# Patient Record
Sex: FEMALE | Race: OTHER | HISPANIC OR LATINO | URBAN - METROPOLITAN AREA
[De-identification: names, ages, dates, MRNs, and addresses within clinical notes are randomized per-mention and may not be internally consistent; named-entity substitution may affect disease eponyms.]

---

## 2017-03-30 ENCOUNTER — OUTPATIENT (OUTPATIENT)
Dept: OUTPATIENT SERVICES | Facility: HOSPITAL | Age: 52
LOS: 1 days | Discharge: HOME | End: 2017-03-30
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
## 2017-06-27 DIAGNOSIS — S82.62XA DISPLACED FRACTURE OF LATERAL MALLEOLUS OF LEFT FIBULA, INITIAL ENCOUNTER FOR CLOSED FRACTURE: ICD-10-CM
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
## 2017-06-27 DIAGNOSIS — I10 ESSENTIAL (PRIMARY) HYPERTENSION: ICD-10-CM
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
## 2018-11-07 ENCOUNTER — EMERGENCY (EMERGENCY)
Facility: HOSPITAL | Age: 53
LOS: 0 days | Discharge: HOME | End: 2018-11-08
Admitting: PHYSICIAN ASSISTANT

## 2018-11-07 VITALS — WEIGHT: 139.99 LBS

## 2018-11-07 VITALS
RESPIRATION RATE: 20 BRPM | OXYGEN SATURATION: 100 % | SYSTOLIC BLOOD PRESSURE: 179 MMHG | HEART RATE: 91 BPM | TEMPERATURE: 97 F | DIASTOLIC BLOOD PRESSURE: 90 MMHG

## 2018-11-07 DIAGNOSIS — J32.0 CHRONIC MAXILLARY SINUSITIS: ICD-10-CM

## 2018-11-07 DIAGNOSIS — I10 ESSENTIAL (PRIMARY) HYPERTENSION: ICD-10-CM

## 2018-11-07 DIAGNOSIS — R09.81 NASAL CONGESTION: ICD-10-CM

## 2018-11-07 DIAGNOSIS — Z79.899 OTHER LONG TERM (CURRENT) DRUG THERAPY: ICD-10-CM

## 2018-11-07 DIAGNOSIS — R05 COUGH: ICD-10-CM

## 2018-11-07 DIAGNOSIS — R06.2 WHEEZING: ICD-10-CM

## 2018-11-07 RX ORDER — IPRATROPIUM/ALBUTEROL SULFATE 18-103MCG
3 AEROSOL WITH ADAPTER (GRAM) INHALATION
Qty: 0 | Refills: 0 | Status: COMPLETED | OUTPATIENT
Start: 2018-11-07 | End: 2018-11-07

## 2018-11-07 RX ADMIN — Medication 3 MILLILITER(S): at 23:20

## 2018-11-07 RX ADMIN — Medication 3 MILLILITER(S): at 23:09

## 2018-11-07 RX ADMIN — Medication 3 MILLILITER(S): at 23:29

## 2018-11-07 NOTE — ED ADULT NURSE NOTE - NSIMPLEMENTINTERV_GEN_ALL_ED
Implemented All Universal Safety Interventions:  [place] to call system. Call bell, personal items and telephone within reach. Instruct patient to call for assistance. Room bathroom lighting operational. Non-slip footwear when patient is off stretcher. Physically safe environment: no spills, clutter or unnecessary equipment. Stretcher in lowest position, wheels locked, appropriate side rails in place.

## 2018-11-07 NOTE — ED PROVIDER NOTE - PROGRESS NOTE DETAILS
Discussed with patient the risks of uncontrolled hypertension including MI, CVA, renal disease , PVD and others.  Pt was instructed to follow up with their PCP within 48 hours to re-asses blood pressure.  Pt was also instructed that if a headache, vision change, altered conciouseness, severe headache, stroke like symptoms or hematuria develop to return to the Emergency Room.  pt advised many OTC cold meds not safe for HTN and pt was not aware; will stop OTC meds aside from tylenol/motrin as needed or saline nasal spray side effects of steroids discussed. risk for GI upset. PUD, gerd, gastrities, bleeding explained.  take steroid w food, may use otc prilosec if GI upset.  d/c use if intolerable s/e , ie pain, psychosis  History, time course, and exam consistent with sinusitis.  For treatment with antibiotics and with nasal spray.Discussed probiotics with antibiotics, potential side effects.  discussed use of humidifiers, vaseline to nares while sleeping, nasal saline for moisture improved post neb tx, ready to go home

## 2018-11-07 NOTE — ED PROVIDER NOTE - OBJECTIVE STATEMENT
pt with sinus/nasal congestion, post nasal drip and cough- worse at night- for 1-2 weeks  has tried several OTC meds with mild relief  no sick contacts or recent travel  Denies fever/chill/HA/dizziness/chest pain/palpitation/sob/abd pain/n/v/d/ black stool/bloody stool/urinary sxs

## 2018-11-07 NOTE — ED ADULT TRIAGE NOTE - CHIEF COMPLAINT QUOTE
"I cant breathe through my nose and my throat feels like mucus is in there. It drips from my nose into my throat the mucus." Symptoms started 3 weeks ago, has tried OTC medication with no relief but reports she used her daughters nebulizer and felt better afterwards. Lungs clear to auscultation. Nehal Goyal  (RN)  2017 16:54:58

## 2018-11-07 NOTE — ED PROVIDER NOTE - NS ED ROS FT
Constitutional: no fever, chills, no recent weight loss, change in appetite or malaise  Eyes: no redness/discharge/pain/vision changes  ENT: no ear pain/sore throat  Cardiac: No chest pain, SOB or edema.  Respiratory: No respiratory distress  GI: No nausea, vomiting, diarrhea or abdominal pain.  : No dysuria, frequency, urgency or hematuria  MS: no pain to back or extremities, no loss of ROM, no weakness  Neuro: No headache or weakness. No LOC.  Skin: No skin rash.  Endocrine: No history of thyroid disease or diabetes.  Except as documented in the HPI, all other systems are negative.

## 2018-11-07 NOTE — ED PROVIDER NOTE - PHYSICAL EXAMINATION
CONSTITUTIONAL: Well-appearing; well-nourished; in no apparent distress.   EYES: PERRL; EOM intact.   ENT: +post nasal drip; normal nose; no rhinorrhea; normal pharynx with no tonsillar hypertrophy.   NECK: Supple; non-tender; no cervical lymphadenopathy. No JVD.   CARDIOVASCULAR: Normal S1, S2; no murmurs, rubs, or gallops.   RESPIRATORY: +mild wheezing b/l, Normal chest excursion with respiration; breath sounds equal bilaterally; no rhonchi, or rales.  SKIN: Normal for age and race; warm; dry; good turgor; no apparent lesions or exudate.   NEURO/PSYCH: A & O x 4; grossly unremarkable. mood and manner are appropriate. Grooming and personal hygiene are appropriate. No apparent thoughts of harm to self or others. CONSTITUTIONAL: Well-appearing; well-nourished; in no apparent distress.   EYES: PERRL; EOM intact.   ENT: +maxillary sinus ttp, +post nasal drip; normal nose; no rhinorrhea; normal pharynx with no tonsillar hypertrophy.   NECK: Supple; non-tender; no cervical lymphadenopathy. No JVD.   CARDIOVASCULAR: Normal S1, S2; no murmurs, rubs, or gallops.   RESPIRATORY: +mild wheezing b/l, Normal chest excursion with respiration; breath sounds equal bilaterally; no rhonchi, or rales.  SKIN: Normal for age and race; warm; dry; good turgor; no apparent lesions or exudate.   NEURO/PSYCH: A & O x 4; grossly unremarkable. mood and manner are appropriate. Grooming and personal hygiene are appropriate. No apparent thoughts of harm to self or others.

## 2018-11-07 NOTE — ED ADULT NURSE NOTE - CHIEF COMPLAINT QUOTE
"I cant breathe through my nose and my throat feels like mucus is in there. It drips from my nose into my throat the mucus." Symptoms started 3 weeks ago, has tried OTC medication with no relief but reports she used her daughters nebulizer and felt better afterwards. Lungs clear to auscultation.

## 2018-11-08 RX ADMIN — Medication 1 TABLET(S): at 00:05

## 2020-01-13 NOTE — ED ADULT NURSE NOTE - OBJECTIVE STATEMENT
The patient is a 89y Male complaining of fall. patient c/o of postnasal drip for 2 days, patient denies n/v/d/.pt states over counter meds didn't relieve symptoms. patient states she has nka. patient has hx of htn and glaucoma

## 2020-05-17 ENCOUNTER — EMERGENCY (EMERGENCY)
Facility: HOSPITAL | Age: 55
LOS: 0 days | Discharge: HOME | End: 2020-05-17
Attending: EMERGENCY MEDICINE | Admitting: EMERGENCY MEDICINE
Payer: COMMERCIAL

## 2020-05-17 VITALS
DIASTOLIC BLOOD PRESSURE: 96 MMHG | OXYGEN SATURATION: 100 % | HEART RATE: 82 BPM | RESPIRATION RATE: 16 BRPM | TEMPERATURE: 97 F | SYSTOLIC BLOOD PRESSURE: 170 MMHG

## 2020-05-17 VITALS
SYSTOLIC BLOOD PRESSURE: 149 MMHG | RESPIRATION RATE: 18 BRPM | DIASTOLIC BLOOD PRESSURE: 83 MMHG | OXYGEN SATURATION: 99 % | HEART RATE: 62 BPM

## 2020-05-17 DIAGNOSIS — I10 ESSENTIAL (PRIMARY) HYPERTENSION: ICD-10-CM

## 2020-05-17 DIAGNOSIS — Z79.899 OTHER LONG TERM (CURRENT) DRUG THERAPY: ICD-10-CM

## 2020-05-17 DIAGNOSIS — H57.10 OCULAR PAIN, UNSPECIFIED EYE: ICD-10-CM

## 2020-05-17 DIAGNOSIS — H40.212 ACUTE ANGLE-CLOSURE GLAUCOMA, LEFT EYE: ICD-10-CM

## 2020-05-17 PROCEDURE — 99284 EMERGENCY DEPT VISIT MOD MDM: CPT

## 2020-05-17 RX ORDER — KETOROLAC TROMETHAMINE 30 MG/ML
30 SYRINGE (ML) INJECTION ONCE
Refills: 0 | Status: DISCONTINUED | OUTPATIENT
Start: 2020-05-17 | End: 2020-05-17

## 2020-05-17 RX ORDER — TIMOLOL 0.5 %
0 DROPS OPHTHALMIC (EYE)
Qty: 0 | Refills: 0 | DISCHARGE

## 2020-05-17 RX ORDER — LOSARTAN POTASSIUM 100 MG/1
0 TABLET, FILM COATED ORAL
Qty: 0 | Refills: 0 | DISCHARGE

## 2020-05-17 RX ORDER — HYDROCHLOROTHIAZIDE 25 MG
0 TABLET ORAL
Qty: 0 | Refills: 0 | DISCHARGE

## 2020-05-17 RX ORDER — BRIMONIDINE TARTRATE 2 MG/MG
1 SOLUTION/ DROPS OPHTHALMIC ONCE
Refills: 0 | Status: COMPLETED | OUTPATIENT
Start: 2020-05-17 | End: 2020-05-17

## 2020-05-17 RX ORDER — ACETAZOLAMIDE 250 MG/1
500 TABLET ORAL ONCE
Refills: 0 | Status: COMPLETED | OUTPATIENT
Start: 2020-05-17 | End: 2020-05-17

## 2020-05-17 RX ORDER — METOPROLOL TARTRATE 50 MG
0 TABLET ORAL
Qty: 0 | Refills: 0 | DISCHARGE

## 2020-05-17 RX ORDER — ACETAZOLAMIDE 250 MG/1
1 TABLET ORAL
Qty: 3 | Refills: 0
Start: 2020-05-17 | End: 2020-05-19

## 2020-05-17 RX ADMIN — Medication 30 MILLIGRAM(S): at 09:16

## 2020-05-17 RX ADMIN — ACETAZOLAMIDE 500 MILLIGRAM(S): 250 TABLET ORAL at 09:16

## 2020-05-17 RX ADMIN — BRIMONIDINE TARTRATE 1 DROP(S): 2 SOLUTION/ DROPS OPHTHALMIC at 11:19

## 2020-05-17 NOTE — ED PROVIDER NOTE - PATIENT PORTAL LINK FT
You can access the FollowMyHealth Patient Portal offered by [facility] by registering at the following website: [email or web address]. By joining [facility]’s FollowMyHealth portal, you will also be able to view your health information using other applications (apps) compatible with our system.

## 2020-05-17 NOTE — ED PROVIDER NOTE - CLINICAL SUMMARY MEDICAL DECISION MAKING FREE TEXT BOX
pt p/w acute angle glaucoma, pressure improved a diamox and eye drops, pain improved, redness resolved, seen by ophtho dr kenney (paper note in chart), f/u tomorrow morning in eye clinic to get laser for definitive treatment, pt dc home w brimonidine drops TID and diamox PO daily x 3 days, strict return precautions provided.

## 2020-05-17 NOTE — ED PROVIDER NOTE - EYE, LEFT
CONJUNCTIVA ERYTHEMA/pupils equal, round, and reactive to light CONJUNCTIVA ERYTHEMA/IOP 47/pupils equal, round, and reactive to light

## 2020-05-17 NOTE — ED PROVIDER NOTE - OBJECTIVE STATEMENT
53 y/o female with hx HTN, Glaucoma presents to the ED c/o "I have left eye pain, redness and blurriness since yesterday." no hx trauma/ HA/ weakness/ drainage 53 y/o female with hx HTN, Glaucoma presents to the ED c/o "I have left eye pain, redness and blurriness since yesterday." no hx trauma/ weakness/ drainage

## 2020-05-17 NOTE — ED PROVIDER NOTE - PROGRESS NOTE DETAILS
IOP in R eye 18, IOP in L eye 47  Spoke with Dr Len jaffe, recommends Diamox 500 mg PO stat (RN gave to pt at 915am), pt is going to take her eye drops q 15mins x 3 (timolol/dorzolamide and Lumigan), and recheck pressure at 11am.   POCUS negative for retinal detachment and vitreous hemorrhage. Negative fluorescein testing. N IOP in R eye 18, IOP in L eye 47  Spoke with Dr Len jaffe, recommends Diamox 500 mg PO stat (RN gave to pt at 915am), pt is going to take her eye drops q 15mins x 3 (timolol/dorzolamide and Lumigan), and recheck pressure at 11am.   POCUS negative for retinal detachment and vitreous hemorrhage. Negative fluorescein testing. No sign of conjunctivitis. 20/25 bilaterally Repeat IOP after drops R eye 11, L eye 60,57,48. Called back Optho CCRUZ: Spoke with Optho, Dr Harrington, will come see pt. Recommends repeating the drops q15 mins x 3 IOP in R eye 18, IOP in L eye 47  Spoke with Dr Len jaffe, recommends Diamox 500 mg PO stat (RN gave to pt at 915am), pt is going to take her eye drops q 15mins x 3 (timolol/dorzolamide and Lumigan), and recheck pressure at 11am.   POCUS negative for retinal detachment and vitreous hemorrhage. No papilledema.  Negative fluorescein testing. No sign of conjunctivitis. 20/25 bilaterally CCRUZ: Spoke with Optho, Dr Harrington, will come see pt. Recommends repeating the drops q15 mins x 3 and adding brimonadine

## 2020-05-17 NOTE — ED PROVIDER NOTE - ATTENDING CONTRIBUTION TO CARE
54F pmh glaucoma, HTN, p/w L eye pain since yesterday 3pm. Pressure constant nonradiating. Assoc w photophobia and blurriness. [place] [facility] [place] fever, cough, cp, sob. No ha, numbness, weakness, tingling, slurred speech, trouble walking. No eye discharge. no eye trauma/itching. Uses reading glasses, no contacts.     on exam, AFVSS, well kylah nad, ncat, eomi, bilateral pupils dilated but reactive, +conjunctiva injected/erythematous, no discharge, no proptosis, no pain w eye movements, no periorbital edema/erythema, no FB, perrla, mmm, aaox3, CN 2-12 intact, No nystagmus.  5/5 motor x 4 ext, SILT x 4 extremities, No facial droop or slurred speech. No pronator drift.  Normal rapid alternating movement and finger nose finger bilaterally. No midline C/T/L tenderness to palpation or step off. Normal gait, No ataxia.  , no le edema or calf ttp,     a/p; Concern for acute angle glaucoma, will check pressure, visual acuity, fluorescein testing for corneal abrasion, POCUS to r/o retinal detachment/papilledema,

## 2020-05-17 NOTE — ED ADULT NURSE NOTE - OBJECTIVE STATEMENT
Patient present to ED with complains of left eye pressure and blurry vision since yesterday while at work, states that pressure is consistent now, denies fever, coughing, abdomen pain, nausea, vomiting, numbness and tingling sensation. Neuro checks intact, noted redness on left eye, no discharges noted.

## 2020-05-17 NOTE — ED PROVIDER NOTE - NSFOLLOWUPCLINICS_GEN_ALL_ED_FT
[facility]  Ophthalmolgy  [address], [address]  [place] [address]  Phone: [phone]  Fax:   Follow Up Time:

## 2020-05-17 NOTE — ED PROVIDER NOTE - NSFOLLOWUPINSTRUCTIONS_ED_ALL_ED_FT
Glaucoma  The nerve connecting the eye to the brain is damaged, usually due to high eye pressure.    Take Diamox 250 mg take 1 tab for next 3 days.  Bromandine 1 drop three times  a day.  Follow-up with Optho clinic tomorrow.  Return to the ED for any worsening symptoms.

## 2020-12-09 ENCOUNTER — EMERGENCY (EMERGENCY)
Facility: HOSPITAL | Age: 55
LOS: 0 days | Discharge: HOME | End: 2020-12-10
Attending: EMERGENCY MEDICINE | Admitting: EMERGENCY MEDICINE
Payer: COMMERCIAL

## 2020-12-09 VITALS
RESPIRATION RATE: 14 BRPM | DIASTOLIC BLOOD PRESSURE: 81 MMHG | SYSTOLIC BLOOD PRESSURE: 128 MMHG | OXYGEN SATURATION: 99 % | TEMPERATURE: 97 F | HEART RATE: 66 BPM

## 2020-12-09 VITALS
HEART RATE: 65 BPM | OXYGEN SATURATION: 100 % | SYSTOLIC BLOOD PRESSURE: 196 MMHG | RESPIRATION RATE: 16 BRPM | TEMPERATURE: 98 F | DIASTOLIC BLOOD PRESSURE: 100 MMHG

## 2020-12-09 DIAGNOSIS — I10 ESSENTIAL (PRIMARY) HYPERTENSION: ICD-10-CM

## 2020-12-09 LAB
ALBUMIN SERPL ELPH-MCNC: 4.9 G/DL — SIGNIFICANT CHANGE UP (ref 3.5–5.2)
ALP SERPL-CCNC: 114 U/L — SIGNIFICANT CHANGE UP (ref 30–115)
ALT FLD-CCNC: 80 U/L — HIGH (ref 0–41)
ANION GAP SERPL CALC-SCNC: 16 MMOL/L — HIGH (ref 7–14)
AST SERPL-CCNC: 43 U/L — HIGH (ref 0–41)
BILIRUB SERPL-MCNC: 0.4 MG/DL — SIGNIFICANT CHANGE UP (ref 0.2–1.2)
BUN SERPL-MCNC: 16 MG/DL — SIGNIFICANT CHANGE UP (ref 10–20)
CALCIUM SERPL-MCNC: 11.5 MG/DL — HIGH (ref 8.5–10.1)
CHLORIDE SERPL-SCNC: 91 MMOL/L — LOW (ref 98–110)
CO2 SERPL-SCNC: 27 MMOL/L — SIGNIFICANT CHANGE UP (ref 17–32)
CREAT SERPL-MCNC: 0.9 MG/DL — SIGNIFICANT CHANGE UP (ref 0.7–1.5)
GLUCOSE SERPL-MCNC: 94 MG/DL — SIGNIFICANT CHANGE UP (ref 70–99)
HCT VFR BLD CALC: 42 % — SIGNIFICANT CHANGE UP (ref 37–47)
HGB BLD-MCNC: 13.9 G/DL — SIGNIFICANT CHANGE UP (ref 12–16)
MCHC RBC-ENTMCNC: 30.3 PG — SIGNIFICANT CHANGE UP (ref 27–31)
MCHC RBC-ENTMCNC: 33.1 G/DL — SIGNIFICANT CHANGE UP (ref 32–37)
MCV RBC AUTO: 91.7 FL — SIGNIFICANT CHANGE UP (ref 81–99)
NRBC # BLD: 0 /100 WBCS — SIGNIFICANT CHANGE UP (ref 0–0)
PLATELET # BLD AUTO: 238 K/UL — SIGNIFICANT CHANGE UP (ref 130–400)
POTASSIUM SERPL-MCNC: 3.7 MMOL/L — SIGNIFICANT CHANGE UP (ref 3.5–5)
POTASSIUM SERPL-SCNC: 3.7 MMOL/L — SIGNIFICANT CHANGE UP (ref 3.5–5)
PROT SERPL-MCNC: 8.9 G/DL — HIGH (ref 6–8)
RBC # BLD: 4.58 M/UL — SIGNIFICANT CHANGE UP (ref 4.2–5.4)
RBC # FLD: 13.9 % — SIGNIFICANT CHANGE UP (ref 11.5–14.5)
SODIUM SERPL-SCNC: 134 MMOL/L — LOW (ref 135–146)
TROPONIN T SERPL-MCNC: <0.01 NG/ML — SIGNIFICANT CHANGE UP
WBC # BLD: 7.6 K/UL — SIGNIFICANT CHANGE UP (ref 4.8–10.8)
WBC # FLD AUTO: 7.6 K/UL — SIGNIFICANT CHANGE UP (ref 4.8–10.8)

## 2020-12-09 PROCEDURE — 99285 EMERGENCY DEPT VISIT HI MDM: CPT

## 2020-12-09 PROCEDURE — 93010 ELECTROCARDIOGRAM REPORT: CPT

## 2020-12-09 PROCEDURE — 70450 CT HEAD/BRAIN W/O DYE: CPT | Mod: 26

## 2020-12-09 RX ORDER — ONDANSETRON 8 MG/1
4 TABLET, FILM COATED ORAL ONCE
Refills: 0 | Status: COMPLETED | OUTPATIENT
Start: 2020-12-09 | End: 2020-12-09

## 2020-12-09 RX ORDER — ACETAMINOPHEN 500 MG
650 TABLET ORAL ONCE
Refills: 0 | Status: COMPLETED | OUTPATIENT
Start: 2020-12-09 | End: 2020-12-09

## 2020-12-09 RX ADMIN — Medication 650 MILLIGRAM(S): at 23:09

## 2020-12-09 NOTE — ED ADULT NURSE NOTE - CHIEF COMPLAINT QUOTE
Pt here for elevated BP. C/o headache and nausea. Pt took an extra losartan before coming to the hospital

## 2020-12-09 NOTE — ED ADULT TRIAGE NOTE - CHIEF COMPLAINT QUOTE
Pt here for elevated BP. C/o headache and nausea. Pt took an extra losartan before coming to the hospital Pt here for elevated BP. C/o headache and nausea. Pt took an extra losartan before coming to the hospital.  can be reached at [phone]

## 2020-12-09 NOTE — ED PROVIDER NOTE - PHYSICAL EXAMINATION
PHYSICAL EXAM: I have reviewed current vital signs.  GENERAL: NAD, well-nourished; well-developed.  HEAD:  Normocephalic, atraumatic.  EYES: EOMI, PERRL, conjunctiva and sclera clear.  ENT: MMM, no erythema/exudates.  NECK: Supple, no JVD.  CHEST/LUNG: Clear to auscultation bilaterally; no wheezes, rales, or rhonchi.  HEART: Regular rate and rhythm, normal S1 and S2; no murmurs, rubs, or gallops.  ABDOMEN: Soft, nontender, nondistended.  EXTREMITIES:  2+ peripheral pulses; no clubbing, cyanosis, or edema.  PSYCH: Cooperative, appropriate, normal mood and affect.  NEUROLOGY: A&O x 3. Motor 5/5. Sensory intact. No focal neurological deficits. CN II - XII intact. (-) dysmetria, facial droop, pronator drift. visual acuity 20/20 in both eyes  SKIN: Warm and dry.

## 2020-12-09 NOTE — ED PROVIDER NOTE - NS ED ROS FT
Constitutional:  No fevers or chills.  Eyes:  (+) visual changes. No eye pain, or discharge.  ENT:  No hearing changes. No sore throat.  Neck:  No neck pain or stiffness.  Cardiac:  No CP or edema.  Resp:  No cough or SOB. No hemoptysis.   GI: (+) nausea and vomiting. No diarrhea or abdominal pain.  :  No dysuria, frequency, or hematuria.  MSK:  No myalgias or joint pain/swelling.  Neuro:  (+) headache.  No dizziness or weakness.  Skin:  No skin rash.

## 2020-12-09 NOTE — ED PROVIDER NOTE - OBJECTIVE STATEMENT
55y female with PMH of glaucoma, HTN, DLD presents with new onset constant HA b/l feels like a band around head,  reports seeing spots,  N/V 2x NBNB, and home BP of 225/108 all starting at 4pm today.  patient was concerned and took an extra dose of her losartan/HCTZ today at 6pm.  BP at triage was 196/100.  Patient is compliant with home BP meds of Losartan/HCTZ and took her meds this am. Denies head trauma, dizziness, weakness, dysphagia, dysarthria, changes in hearing, chest pain, abd pain, constipation, diarrhea.

## 2020-12-09 NOTE — ED ADULT NURSE NOTE - OBJECTIVE STATEMENT
Pt here for elevated BP. C/o headache and nausea. Pt took an extra losartan before coming to the hospital. Reports BP normally being normal. Felt numbness and tingling 1 hour to coming in but does not feel that now. No weakness.

## 2020-12-09 NOTE — ED PROVIDER NOTE - CLINICAL SUMMARY MEDICAL DECISION MAKING FREE TEXT BOX
Pt presented with elevated BP and associated headache, n/v. Took stra dose of home meds 3 hours prior to ED eval. Required ekg, labs, imaging and meds. No acute findings. Symptoms improved in the ED. Will d/c with outpt f/up.

## 2020-12-09 NOTE — ED PROVIDER NOTE - ATTENDING CONTRIBUTION TO CARE
I personally evaluated the patient. I reviewed the Resident’s or Physician Assistant’s note (as assigned above), and agree with the findings and plan except as documented in my note.  55 F with hx of HTN, HLD and glaucoma, on Losartan 100 mg and HCTZ 25 mg daily presents with complaints of elevation of BP that she noted when developed a headache. Symptoms associated with nausea and 2 episodes of nbnb vomiting. Pt took an xtra dose of her Losartan and HCTZ prior to coming. No abd pain, CP, SOB, trauma. VS reviewed, pt non-toxic appearing, NAD. Head ncat, PERRLA, EOMI, MMM, neck supple, normal ROM, normal s1s2 without any murmurs, Lungs CTAB with normal work of breathing. abd +BS, s/nd/nt, extremities wnl, AAO x 3, CN II to XII wnl, normal motor, sensation, cerebellar and gait exams. No acute skin rashes. Plan is labs, imaging, meds and reassess.

## 2020-12-09 NOTE — ED PROVIDER NOTE - NSFOLLOWUPINSTRUCTIONS_ED_ALL_ED_FT
Hypertension, Adult    High blood pressure (hypertension) is when the force of blood pumping through the arteries is too strong. The arteries are the blood vessels that carry blood from the heart throughout the body. Hypertension forces the heart to work harder to pump blood and may cause arteries to become narrow or stiff. Untreated or uncontrolled hypertension can cause a heart attack, heart failure, a stroke, kidney disease, and other problems.    A blood pressure reading consists of a higher number over a lower number. Ideally, your blood pressure should be below 120/80. The first ("top") number is called the systolic pressure. It is a measure of the pressure in your arteries as your heart beats. The second ("bottom") number is called the diastolic pressure. It is a measure of the pressure in your arteries as the heart relaxes.    What are the causes?  The exact cause of this condition is not known. There are some conditions that result in or are related to high blood pressure.    What increases the risk?  Some risk factors for high blood pressure are under your control. The following factors may make you more likely to develop this condition:  •Smoking.  •Having type 2 diabetes mellitus, high cholesterol, or both.  •Not getting enough exercise or physical activity.  •Being overweight.  •Having too much fat, sugar, calories, or salt (sodium) in your diet.  •Drinking too much alcohol.    Some risk factors for high blood pressure may be difficult or impossible to change. Some of these factors include:  •Having chronic kidney disease.  •Having a family history of high blood pressure.  •Age. Risk increases with age.  •Race. You may be at higher risk if you are .  •Gender. Men are at higher risk than women before age 45. After age 65, women are at higher risk than men.  •Having obstructive sleep apnea.  •Stress.    What are the signs or symptoms?  High blood pressure may not cause symptoms. Very high blood pressure (hypertensive crisis) may cause:  •Headache.  •Anxiety.  •Shortness of breath.  •Nosebleed.  •Nausea and vomiting.  •Vision changes.  •Severe chest pain.  •Seizures.    How is this diagnosed?    This condition is diagnosed by measuring your blood pressure while you are seated, with your arm resting on a flat surface, your legs uncrossed, and your feet flat on the floor. The cuff of the blood pressure monitor will be placed directly against the skin of your upper arm at the level of your heart. It should be measured at least twice using the same arm. Certain conditions can cause a difference in blood pressure between your right and left arms.    Certain factors can cause blood pressure readings to be lower or higher than normal for a short period of time:  •When your blood pressure is higher when you are in a health care provider's office than when you are at home, this is called white coat hypertension. Most people with this condition do not need medicines.  •When your blood pressure is higher at home than when you are in a health care provider's office, this is called masked hypertension. Most people with this condition may need medicines to control blood pressure.    If you have a high blood pressure reading during one visit or you have normal blood pressure with other risk factors, you may be asked to:  •Return on a different day to have your blood pressure checked again.  •Monitor your blood pressure at home for 1 week or longer.    If you are diagnosed with hypertension, you may have other blood or imaging tests to help your health care provider understand your overall risk for other conditions.    How is this treated?  This condition is treated by making healthy lifestyle changes, such as eating healthy foods, exercising more, and reducing your alcohol intake. Your health care provider may prescribe medicine if lifestyle changes are not enough to get your blood pressure under control, and if:  •Your systolic blood pressure is above 130.  •Your diastolic blood pressure is above 80.    Your personal target blood pressure may vary depending on your medical conditions, your age, and other factors.    Follow these instructions at home:    Eating and drinking    •Eat a diet that is high in fiber and potassium, and low in sodium, added sugar, and fat. An example eating plan is called the DASH (Dietary Approaches to Stop Hypertension) diet. To eat this way:  •Eat plenty of fresh fruits and vegetables. Try to fill one half of your plate at each meal with fruits and vegetables.  •Eat whole grains, such as whole-wheat pasta, brown rice, or whole-grain bread. Fill about one fourth of your plate with whole grains.  •Eat or drink low-fat dairy products, such as skim milk or low-fat yogurt.  •Avoid fatty cuts of meat, processed or cured meats, and poultry with skin. Fill about one fourth of your plate with lean proteins, such as fish, chicken without skin, beans, eggs, or tofu.  •Avoid pre-made and processed foods. These tend to be higher in sodium, added sugar, and fat.  •Reduce your daily sodium intake. Most people with hypertension should eat less than 1,500 mg of sodium a day.    • Do not drink alcohol if:  •Your health care provider tells you not to drink.  •You are pregnant, may be pregnant, or are planning to become pregnant.  •If you drink alcohol:•Limit how much you use to:  •0–1 drink a day for women.  •0–2 drinks a day for men.  •Be aware of how much alcohol is in your drink. In the [place] one drink equals one 12 oz bottle of beer (355 mL), one 5 oz glass of wine (148 mL), or one 1½ oz glass of hard liquor (44 mL).    Lifestyle   •Work with your health care provider to maintain a healthy body weight or to lose weight. Ask what an ideal weight is for you.  •Get at least 30 minutes of exercise most days of the week. Activities may include walking, swimming, or biking.  •Include exercise to strengthen your muscles (resistance exercise), such as Pilates or lifting weights, as part of your weekly exercise routine. Try to do these types of exercises for 30 minutes at least 3 days a week.  • Do not use any products that contain nicotine or tobacco, such as cigarettes, e-cigarettes, and chewing tobacco. If you need help quitting, ask your health care provider.  •Monitor your blood pressure at home as told by your health care provider.  •Keep all follow-up visits as told by your health care provider. This is important.    Medicines   •Take over-the-counter and prescription medicines only as told by your health care provider. Follow directions carefully. Blood pressure medicines must be taken as prescribed.  • Do not skip doses of blood pressure medicine. Doing this puts you at risk for problems and can make the medicine less effective.  •Ask your health care provider about side effects or reactions to medicines that you should watch for.    Contact a health care provider if you:  •Think you are having a reaction to a medicine you are taking.  •Have headaches that keep coming back (recurring).  •Feel dizzy.  •Have swelling in your ankles.  •Have trouble with your vision.    Get help right away if you:  •Develop a severe headache or confusion.  •Have unusual weakness or numbness.  •Feel faint.  •Have severe pain in your chest or abdomen.  •Vomit repeatedly.  •Have trouble breathing.    Summary  •Hypertension is when the force of blood pumping through your arteries is too strong. If this condition is not controlled, it may put you at risk for serious complications.  •Your personal target blood pressure may vary depending on your medical conditions, your age, and other factors. For most people, a normal blood pressure is less than 120/80.  •Hypertension is treated with lifestyle changes, medicines, or a combination of both. Lifestyle changes include losing weight, eating a healthy, low-sodium diet, exercising more, and limiting alcohol.

## 2020-12-10 PROBLEM — R73.03 PREDIABETES: Chronic | Status: ACTIVE | Noted: 2020-05-17

## 2020-12-10 PROBLEM — H40.9 UNSPECIFIED GLAUCOMA: Chronic | Status: ACTIVE | Noted: 2020-05-17

## 2020-12-10 LAB
APPEARANCE UR: CLEAR — SIGNIFICANT CHANGE UP
BILIRUB UR-MCNC: NEGATIVE — SIGNIFICANT CHANGE UP
COLOR SPEC: COLORLESS — SIGNIFICANT CHANGE UP
DIFF PNL FLD: NEGATIVE — SIGNIFICANT CHANGE UP
GLUCOSE UR QL: NEGATIVE — SIGNIFICANT CHANGE UP
KETONES UR-MCNC: ABNORMAL
LEUKOCYTE ESTERASE UR-ACNC: NEGATIVE — SIGNIFICANT CHANGE UP
NITRITE UR-MCNC: NEGATIVE — SIGNIFICANT CHANGE UP
PH UR: 7 — SIGNIFICANT CHANGE UP (ref 5–8)
PROT UR-MCNC: NEGATIVE — SIGNIFICANT CHANGE UP
SP GR SPEC: 1.01 — SIGNIFICANT CHANGE UP (ref 1.01–1.03)
UROBILINOGEN FLD QL: SIGNIFICANT CHANGE UP

## 2020-12-10 RX ADMIN — ONDANSETRON 4 MILLIGRAM(S): 8 TABLET, FILM COATED ORAL at 00:08

## 2024-01-17 NOTE — ED ADULT NURSE NOTE - NS_NURSE_DISC_TEACHING_YN_ED_ALL_ED
----- Message from Megan Casanova CMA sent at 1/16/2024  5:17 PM CST -----    ----- Message -----  From: Mariela Douglass MD  Sent: 1/16/2024   5:12 PM CST  To: TAYO Douglass  Nurse Msg Pool    Please inform patient that she is positive for influenza A.  Patient to consume warm liquids to take Tylenol for body pains and to go to the emergency room if she were to develop any shortness of breath     Yes

## 2025-03-19 NOTE — ED PROVIDER NOTE - CONDITION AT DISCHARGE:
Detail Level: Detailed
Quality 226: Preventive Care And Screening: Tobacco Use: Screening And Cessation Intervention: Patient screened for tobacco use and is an ex/non-smoker
Satisfactory